# Patient Record
Sex: FEMALE | Race: WHITE | Employment: PART TIME | URBAN - METROPOLITAN AREA
[De-identification: names, ages, dates, MRNs, and addresses within clinical notes are randomized per-mention and may not be internally consistent; named-entity substitution may affect disease eponyms.]

---

## 2017-03-30 ENCOUNTER — HOSPITAL ENCOUNTER (OUTPATIENT)
Dept: SURGERY | Age: 47
Discharge: HOME | End: 2017-03-30
Attending: INTERNAL MEDICINE | Admitting: INTERNAL MEDICINE

## 2017-03-30 VITALS
TEMPERATURE: 97.5 F | HEIGHT: 68 IN | RESPIRATION RATE: 16 BRPM | WEIGHT: 160 LBS | DIASTOLIC BLOOD PRESSURE: 75 MMHG | OXYGEN SATURATION: 100 % | SYSTOLIC BLOOD PRESSURE: 106 MMHG | BODY MASS INDEX: 24.25 KG/M2 | HEART RATE: 72 BPM

## 2017-03-30 LAB — PREGNANCY, URINE: NEGATIVE

## 2017-03-30 RX ORDER — LIDOCAINE HYDROCHLORIDE 10 MG/ML
0.1 INJECTION, SOLUTION EPIDURAL; INFILTRATION; INTRACAUDAL; PERINEURAL ONCE
Status: DISCONTINUED | OUTPATIENT
Start: 2017-03-30 | End: 2017-03-31 | Stop reason: HOSPADM

## 2017-03-30 RX ORDER — SODIUM CHLORIDE, SODIUM LACTATE, POTASSIUM CHLORIDE, CALCIUM CHLORIDE 600; 310; 30; 20 MG/100ML; MG/100ML; MG/100ML; MG/100ML
INJECTION, SOLUTION INTRAVENOUS CONTINUOUS
Status: DISCONTINUED | OUTPATIENT
Start: 2017-03-30 | End: 2017-03-31 | Stop reason: HOSPADM

## 2017-03-30 RX ADMIN — SODIUM CHLORIDE, SODIUM LACTATE, POTASSIUM CHLORIDE, CALCIUM CHLORIDE: 600; 310; 30; 20 INJECTION, SOLUTION INTRAVENOUS at 07:15

## 2017-03-30 ASSESSMENT — PAIN SCALES - GENERAL: PAINLEVEL_OUTOF10: 0

## 2017-03-30 ASSESSMENT — PAIN - FUNCTIONAL ASSESSMENT: PAIN_FUNCTIONAL_ASSESSMENT: 0-10

## 2017-11-08 ENCOUNTER — OFFICE VISIT (OUTPATIENT)
Dept: FAMILY MEDICINE CLINIC | Age: 47
End: 2017-11-08

## 2017-11-08 VITALS
OXYGEN SATURATION: 98 % | HEIGHT: 68 IN | DIASTOLIC BLOOD PRESSURE: 62 MMHG | BODY MASS INDEX: 23.64 KG/M2 | WEIGHT: 156 LBS | SYSTOLIC BLOOD PRESSURE: 96 MMHG | RESPIRATION RATE: 16 BRPM

## 2017-11-08 DIAGNOSIS — R42 DIZZINESS: Primary | ICD-10-CM

## 2017-11-08 LAB
A/G RATIO: 1.8 (ref 1.1–2.2)
ALBUMIN SERPL-MCNC: 4.6 G/DL (ref 3.4–5)
ALP BLD-CCNC: 50 U/L (ref 40–129)
ALT SERPL-CCNC: 14 U/L (ref 10–40)
ANION GAP SERPL CALCULATED.3IONS-SCNC: 14 MMOL/L (ref 3–16)
AST SERPL-CCNC: 18 U/L (ref 15–37)
BILIRUB SERPL-MCNC: 0.7 MG/DL (ref 0–1)
BUN BLDV-MCNC: 12 MG/DL (ref 7–20)
CALCIUM SERPL-MCNC: 9.8 MG/DL (ref 8.3–10.6)
CHLORIDE BLD-SCNC: 100 MMOL/L (ref 99–110)
CO2: 26 MMOL/L (ref 21–32)
CREAT SERPL-MCNC: 0.6 MG/DL (ref 0.6–1.1)
GFR AFRICAN AMERICAN: >60
GFR NON-AFRICAN AMERICAN: >60
GLOBULIN: 2.5 G/DL
GLUCOSE BLD-MCNC: 91 MG/DL (ref 70–99)
HCT VFR BLD CALC: 42.1 % (ref 36–48)
HEMOGLOBIN: 13.9 G/DL (ref 12–16)
MCH RBC QN AUTO: 31.6 PG (ref 26–34)
MCHC RBC AUTO-ENTMCNC: 33.1 G/DL (ref 31–36)
MCV RBC AUTO: 95.5 FL (ref 80–100)
PDW BLD-RTO: 12.7 % (ref 12.4–15.4)
PLATELET # BLD: 266 K/UL (ref 135–450)
PMV BLD AUTO: 8.7 FL (ref 5–10.5)
POTASSIUM SERPL-SCNC: 4.3 MMOL/L (ref 3.5–5.1)
RBC # BLD: 4.41 M/UL (ref 4–5.2)
SODIUM BLD-SCNC: 140 MMOL/L (ref 136–145)
T4 FREE: 1.3 NG/DL (ref 0.9–1.8)
TOTAL PROTEIN: 7.1 G/DL (ref 6.4–8.2)
TSH SERPL DL<=0.05 MIU/L-ACNC: 2.92 UIU/ML (ref 0.27–4.2)
WBC # BLD: 6.6 K/UL (ref 4–11)

## 2017-11-08 PROCEDURE — 36415 COLL VENOUS BLD VENIPUNCTURE: CPT | Performed by: FAMILY MEDICINE

## 2017-11-08 PROCEDURE — 99213 OFFICE O/P EST LOW 20 MIN: CPT | Performed by: FAMILY MEDICINE

## 2017-11-08 ASSESSMENT — PATIENT HEALTH QUESTIONNAIRE - PHQ9
SUM OF ALL RESPONSES TO PHQ QUESTIONS 1-9: 0
SUM OF ALL RESPONSES TO PHQ9 QUESTIONS 1 & 2: 0
2. FEELING DOWN, DEPRESSED OR HOPELESS: 0
1. LITTLE INTEREST OR PLEASURE IN DOING THINGS: 0

## 2017-11-08 NOTE — PROGRESS NOTES
Subjective:      Patient ID: Melodie Navarro is a 40 y.o. female. HPI Patient c/o  Lightheadedness/ dizziness  Which has been going on for several months. Denies any syncope. Usually happens when she  Change position. No assoc CO, SOB or GLASS. She  Gets about 6-7 hrs  Of sleep. Her  2 girls have hx of  Low BP and have   Same  Symptoms. Denies Fhx of HD or DM. Review of Systems  Comprehensive  ROS reviewed are negative except the positives in [place]    Objective:   Physical Exam   Constitutional: She is oriented to person, place, and time. She appears well-developed and well-nourished. No distress. HENT:   Head: Normocephalic and atraumatic. Right Ear: External ear normal.   Left Ear: External ear normal.   Nose: Nose normal.   Eyes: Right eye exhibits no discharge. Neck: Normal range of motion. Neck supple. No thyromegaly present. Cardiovascular: Normal rate, regular rhythm, normal heart sounds and intact distal pulses. Pulmonary/Chest: Effort normal and breath sounds normal. No respiratory distress. She has no wheezes. She has no rales. Abdominal: Soft. Bowel sounds are normal. She exhibits no distension. There is no tenderness. There is no rebound. Musculoskeletal: Normal range of motion. She exhibits no edema, tenderness or deformity. Lymphadenopathy:     She has no cervical adenopathy. Neurological: She is alert and oriented to person, place, and time. Skin: She is not diaphoretic. Nursing note and vitals reviewed. Assessment:      1. Dizziness             Plan:      Orders Placed This Encounter   Procedures    CBC    Comprehensive Metabolic Panel    TSH without Reflex    T4, Free     Increase fluid intake  May add a little salt in the diet to help raise BP.

## 2018-03-23 ENCOUNTER — HOSPITAL ENCOUNTER (OUTPATIENT)
Dept: MAMMOGRAPHY | Age: 48
Discharge: HOME | End: 2018-03-23
Attending: OBSTETRICS & GYNECOLOGY | Admitting: OBSTETRICS & GYNECOLOGY

## 2018-03-23 DIAGNOSIS — Z12.31 ENCOUNTER FOR SCREENING MAMMOGRAM FOR BREAST CANCER: ICD-10-CM

## 2019-12-18 ENCOUNTER — HOSPITAL ENCOUNTER (OUTPATIENT)
Dept: WOMENS IMAGING | Age: 49
Discharge: HOME | End: 2019-12-18
Payer: COMMERCIAL

## 2019-12-18 DIAGNOSIS — N64.4 PAIN IN BREAST: ICD-10-CM

## 2019-12-18 PROCEDURE — G0279 TOMOSYNTHESIS, MAMMO: HCPCS

## 2019-12-18 PROCEDURE — 76642 ULTRASOUND BREAST LIMITED: CPT

## 2020-01-30 ENCOUNTER — HOSPITAL ENCOUNTER (OUTPATIENT)
Dept: MRI IMAGING | Age: 50
Discharge: HOME | End: 2020-01-30
Payer: COMMERCIAL

## 2020-02-05 ENCOUNTER — HOSPITAL ENCOUNTER (EMERGENCY)
Age: 50
Discharge: HOME | End: 2020-02-05
Attending: EMERGENCY MEDICINE
Payer: COMMERCIAL

## 2020-02-05 ENCOUNTER — APPOINTMENT (OUTPATIENT)
Dept: CT IMAGING | Age: 50
End: 2020-02-05
Payer: COMMERCIAL

## 2020-02-05 VITALS
HEIGHT: 68 IN | TEMPERATURE: 97.6 F | OXYGEN SATURATION: 99 % | RESPIRATION RATE: 15 BRPM | BODY MASS INDEX: 23.64 KG/M2 | WEIGHT: 156 LBS | SYSTOLIC BLOOD PRESSURE: 115 MMHG | HEART RATE: 72 BPM | DIASTOLIC BLOOD PRESSURE: 70 MMHG

## 2020-02-05 LAB
ANION GAP SERPL CALCULATED.3IONS-SCNC: 11 MMOL/L (ref 3–16)
BASOPHILS ABSOLUTE: 0 K/UL (ref 0–0.2)
BASOPHILS RELATIVE PERCENT: 0.5 %
BUN BLDV-MCNC: 13 MG/DL (ref 7–20)
CALCIUM SERPL-MCNC: 9.5 MG/DL (ref 8.3–10.6)
CHLORIDE BLD-SCNC: 101 MMOL/L (ref 99–110)
CO2: 26 MMOL/L (ref 21–32)
CREAT SERPL-MCNC: 0.7 MG/DL (ref 0.6–1.1)
EOSINOPHILS ABSOLUTE: 0.1 K/UL (ref 0–0.6)
EOSINOPHILS RELATIVE PERCENT: 2 %
GFR AFRICAN AMERICAN: >60
GFR NON-AFRICAN AMERICAN: >60
GLUCOSE BLD-MCNC: 103 MG/DL (ref 70–99)
GLUCOSE BLD-MCNC: 107 MG/DL (ref 70–99)
HCG QUALITATIVE: NEGATIVE
HCT VFR BLD CALC: 41.5 % (ref 36–48)
HEMOGLOBIN: 14 G/DL (ref 12–16)
LYMPHOCYTES ABSOLUTE: 1 K/UL (ref 1–5.1)
LYMPHOCYTES RELATIVE PERCENT: 16.6 %
MCH RBC QN AUTO: 31.8 PG (ref 26–34)
MCHC RBC AUTO-ENTMCNC: 33.8 G/DL (ref 31–36)
MCV RBC AUTO: 93.9 FL (ref 80–100)
MONOCYTES ABSOLUTE: 0.4 K/UL (ref 0–1.3)
MONOCYTES RELATIVE PERCENT: 7.5 %
NEUTROPHILS ABSOLUTE: 4.4 K/UL (ref 1.7–7.7)
NEUTROPHILS RELATIVE PERCENT: 73.4 %
PDW BLD-RTO: 13.1 % (ref 12.4–15.4)
PERFORMED ON: ABNORMAL
PLATELET # BLD: 226 K/UL (ref 135–450)
PMV BLD AUTO: 7.9 FL (ref 5–10.5)
POTASSIUM SERPL-SCNC: 4.5 MMOL/L (ref 3.5–5.1)
RBC # BLD: 4.42 M/UL (ref 4–5.2)
SODIUM BLD-SCNC: 138 MMOL/L (ref 136–145)
WBC # BLD: 6 K/UL (ref 4–11)

## 2020-02-05 PROCEDURE — 84703 CHORIONIC GONADOTROPIN ASSAY: CPT

## 2020-02-05 PROCEDURE — 70450 CT HEAD/BRAIN W/O DYE: CPT

## 2020-02-05 PROCEDURE — 80048 BASIC METABOLIC PNL TOTAL CA: CPT

## 2020-02-05 PROCEDURE — 85025 COMPLETE CBC W/AUTO DIFF WBC: CPT

## 2020-02-05 PROCEDURE — 99284 EMERGENCY DEPT VISIT MOD MDM: CPT

## 2020-02-05 PROCEDURE — 6370000000 HC RX 637 (ALT 250 FOR IP): Performed by: EMERGENCY MEDICINE

## 2020-02-05 RX ORDER — PREDNISONE 20 MG/1
60 TABLET ORAL ONCE
Status: COMPLETED | OUTPATIENT
Start: 2020-02-05 | End: 2020-02-05

## 2020-02-05 RX ORDER — MINERAL OIL, PETROLATUM 425; 568 MG/G; MG/G
0.5 OINTMENT OPHTHALMIC NIGHTLY PRN
Qty: 1 TUBE | Refills: 0 | Status: SHIPPED | OUTPATIENT
Start: 2020-02-05

## 2020-02-05 RX ORDER — FAMCICLOVIR 500 MG/1
500 TABLET, FILM COATED ORAL 3 TIMES DAILY
Qty: 21 TABLET | Refills: 0 | Status: SHIPPED | OUTPATIENT
Start: 2020-02-05 | End: 2020-02-12

## 2020-02-05 RX ORDER — PREDNISONE 20 MG/1
TABLET ORAL
Qty: 21 TABLET | Refills: 0 | Status: SHIPPED | OUTPATIENT
Start: 2020-02-05

## 2020-02-05 RX ADMIN — PREDNISONE 60 MG: 20 TABLET ORAL at 13:45

## 2020-02-05 ASSESSMENT — PAIN DESCRIPTION - LOCATION: LOCATION: HEAD

## 2020-02-05 ASSESSMENT — PAIN SCALES - GENERAL: PAINLEVEL_OUTOF10: 4

## 2020-02-07 NOTE — ED PROVIDER NOTES
file    Number of children: Not on file    Years of education: Not on file    Highest education level: Not on file   Occupational History    Not on file   Social Needs    Financial resource strain: Not on file    Food insecurity:     Worry: Not on file     Inability: Not on file    Transportation needs:     Medical: Not on file     Non-medical: Not on file   Tobacco Use    Smoking status: Passive Smoke Exposure - Never Smoker    Smokeless tobacco: Never Used   Substance and Sexual Activity    Alcohol use: Yes     Alcohol/week: 0.0 standard drinks     Comment: 24/wk    Drug use: No    Sexual activity: Yes     Partners: Male   Lifestyle    Physical activity:     Days per week: Not on file     Minutes per session: Not on file    Stress: Not on file   Relationships    Social connections:     Talks on phone: Not on file     Gets together: Not on file     Attends Mosque service: Not on file     Active member of club or organization: Not on file     Attends meetings of clubs or organizations: Not on file     Relationship status: Not on file    Intimate partner violence:     Fear of current or ex partner: Not on file     Emotionally abused: Not on file     Physically abused: Not on file     Forced sexual activity: Not on file   Other Topics Concern    Not on file   Social History Narrative    Not on file       Nursing Notes Reviewed      ROS:  GENERAL:  No fever, no chills, no diaphoresis, no appetite changes  EYES: no eye discharge, no eye redness, no visual changes  ENT: no nasal congestion, no sore throat  CARDIAC: no chest pain, no palpitations, no leg swelling  PULM: no cough, no shortness of breath  ABD: no abdominal pain, no nausea, no vomiting, no diarrhea  : no dysuria, no hematuria, no urgency, no frequency.  No flank pain  MUSCULOSKELETAL: no back pain, no arthralgias, no myalgias  NEURO: + headache, no lightheadedness, no dizziness, + numbness, + weakness, no syncope, no confusion, no radiologic studies (i.e. CT, MRI, ultrasounds, etc ) have been interpreted by radiologist.     [address] [place] [address]   Final Result   No acute intracranial abnormality. Labs:  Results for orders placed or performed during the hospital encounter of 02/05/20   CBC Auto Differential   Result Value Ref Range    WBC 6.0 4.0 - 11.0 K/uL    RBC 4.42 4.00 - 5.20 M/uL    Hemoglobin 14.0 12.0 - 16.0 g/dL    Hematocrit 41.5 36.0 - 48.0 %    MCV 93.9 80.0 - 100.0 fL    MCH 31.8 26.0 - 34.0 pg    MCHC 33.8 31.0 - 36.0 g/dL    RDW 13.1 12.4 - 15.4 %    Platelets 425 179 - 482 K/uL    MPV 7.9 5.0 - 10.5 fL    Neutrophils % 73.4 %    Lymphocytes % 16.6 %    Monocytes % 7.5 %    Eosinophils % 2.0 %    Basophils % 0.5 %    Neutrophils Absolute 4.4 1.7 - 7.7 K/uL    Lymphocytes Absolute 1.0 1.0 - 5.1 K/uL    Monocytes Absolute 0.4 0.0 - 1.3 K/uL    Eosinophils Absolute 0.1 0.0 - 0.6 K/uL    Basophils Absolute 0.0 0.0 - 0.2 K/uL   Basic Metabolic Panel   Result Value Ref Range    Sodium 138 136 - 145 mmol/L    Potassium 4.5 3.5 - 5.1 mmol/L    Chloride 101 99 - 110 mmol/L    CO2 26 21 - 32 mmol/L    Anion Gap 11 3 - 16    Glucose 103 (H) 70 - 99 mg/dL    BUN 13 7 - 20 mg/dL    CREATININE 0.7 0.6 - 1.1 mg/dL    GFR Non-African American >60 >60    GFR African American >60 >60    Calcium 9.5 8.3 - 10.6 mg/dL   HCG Qualitative, Serum   Result Value Ref Range    hCG Qual Negative Detects HCG level >10 MIU/mL   POCT Glucose   Result Value Ref Range    POC Glucose 107 (H) 70 - 99 mg/dl    Performed on ACCU-CHEK        Treatment in the department:  Patient received the following while in the ED. Medications   predniSONE (DELTASONE) tablet 60 mg (60 mg Oral Given 2/5/20 1345)       Medical decision making:  Patient with isolated facial paresthesia and slight facial droop. No risk factors for CVA. No extremity symptoms. Left postauricular headache. CT negative. I suspect this represents bells palsy rather than stroke.  Will place on

## 2020-07-17 ENCOUNTER — HOSPITAL ENCOUNTER (OUTPATIENT)
Dept: WOMENS IMAGING | Age: 50
Discharge: HOME | End: 2020-07-17
Payer: COMMERCIAL

## 2020-07-17 PROCEDURE — G0279 TOMOSYNTHESIS, MAMMO: HCPCS

## 2020-09-13 ENCOUNTER — HOSPITAL ENCOUNTER (EMERGENCY)
Age: 50
Discharge: HOME | End: 2020-09-13
Payer: COMMERCIAL

## 2020-09-13 ENCOUNTER — APPOINTMENT (OUTPATIENT)
Dept: GENERAL RADIOLOGY | Age: 50
End: 2020-09-13
Payer: COMMERCIAL

## 2020-09-13 VITALS
HEIGHT: 68 IN | DIASTOLIC BLOOD PRESSURE: 57 MMHG | WEIGHT: 166 LBS | HEART RATE: 74 BPM | SYSTOLIC BLOOD PRESSURE: 99 MMHG | BODY MASS INDEX: 25.16 KG/M2 | OXYGEN SATURATION: 99 % | RESPIRATION RATE: 16 BRPM | TEMPERATURE: 98.7 F

## 2020-09-13 PROCEDURE — 73610 X-RAY EXAM OF ANKLE: CPT

## 2020-09-13 PROCEDURE — 99283 EMERGENCY DEPT VISIT LOW MDM: CPT

## 2020-09-13 ASSESSMENT — PAIN DESCRIPTION - LOCATION: LOCATION: ANKLE

## 2020-09-13 ASSESSMENT — PAIN SCALES - GENERAL: PAINLEVEL_OUTOF10: 4

## 2020-09-13 ASSESSMENT — PAIN DESCRIPTION - PAIN TYPE: TYPE: ACUTE PAIN

## 2020-09-13 NOTE — ED PROVIDER NOTES
[facility]  ED  eMERGENCY dEPARTMENT eNCOUnter        Pt Name: Little Marinelli  MRN: [id number]  [place] 1970  Date of evaluation: 9/13/2020  Provider: Gino Sampson PA-C  PCP: Gracy Cifuentes MD  ED Attending: Carl Carter MD    This patient was not seen by the ED attending  History is provided by the patient    CHIEF COMPLAINT:  Ankle Pain (patient rolled right ankle last night)      HISTORY OF PRESENT ILLNESS:  Little Marinelli is a 51 y.o. female who presents to the ED via private vehicle with complaints of right ankle injury. The patient reports \"rolling her ankle\" last night. She states she was walking in her driveway with flip-flops. It was raining and wet and she describes an inversion injury. She immediately had pain and has developed swelling over the lateral aspect of the right ankle joint. At rest she reports ankle pain is 4/10. With movement or attempts at weightbearing/ambulation pain is 9/10. She took Tylenol this morning for pain without much relief. She denies other injuries from this event. No other complaints, modifying factors or associated symptoms. Nursing notes reviewed. Past Medical History:   Diagnosis Date    Arthritis     Back pain      Past Surgical History:   Procedure Laterality Date    BREAST SURGERY      COLONOSCOPY  03/30/2017     History reviewed. No pertinent family history.   Social History     Socioeconomic History    Marital status:      Spouse name: Not on file    Number of children: Not on file    Years of education: Not on file    Highest education level: Not on file   Occupational History    Not on file   Social Needs    Financial resource strain: Not on file    Food insecurity     Worry: Not on file     Inability: Not on file    Transportation needs     Medical: Not on file     Non-medical: Not on file   Tobacco Use    Smoking status: Passive Smoke Exposure - Never Smoker    Smokeless tobacco: Never Awake and alert. Well-developed. Well-nourished. Non-toxic. Cooperative. No acute distress. HENT: Normocephalic. Atraumatic. External ears normal, without discharge. Nose normal. Mucous membranes moist.  EYES: Conjunctiva non-injected. No scleral icterus. PERRL. EOM's grossly intact. NECK: Supple. Normal ROM. CARDIOVASCULAR: Normal heart rate. Intact distal pulses. PULMONARY/CHEST WALL: Breathing is unlabored. Equal, symmetric chest rise. Speaking comfortably in full sentences. ABDOMEN: Nondistended  MUSKULOSKELETAL: Right lower extremity: No acute deformities. Swelling over the right lateral malleolus with tenderness to palpate. No crepitus. There is mild tenderness over the Achilles though no defect and negative Jimenez test.  Limited range of motion of ankle due to pain. Other joints with good ROM. SKIN: Warm and dry. Skin intact. No bruising. NEUROLOGICAL: Alert and oriented x 3. Strength is 5/5 in all extremities and sensation is intact. PSYCHIATRIC: Normal affect    Labs:    None    RADIOLOGY:    All x-ray studies are viewed/reviewed by me. Formal interpretations per the radiologist are as follows:      Xr Ankle Right (min 3 Views)    Result Date: 9/13/2020  Mild ankle soft tissue injury. ED COURSE/MDM:  Patient was given the following medications: None      I have evaluated this patient here in the ED. patient arrives with a inversion injury to the right ankle that occurred last night. There is pain and swelling over the lateral malleolus. X-rays of the ankle joint are negative for fracture. Patient is placed in an Ace wrap and Aircast.  She has crutches at home to assist with ambulation. She declines anything for pain in the ED because she has not eaten yet. She has tramadol at home which she takes on an as-needed basis for back pain. I recommended this of her ankle pain was severe. Otherwise I recommended over-the-counter analgesics like Tylenol or ibuprofen.   She will be given an orthopedic referral for persistent or worsening pain. I estimate there is LOW risk for ACUTE FRACTURE, COMPARTMENT SYNDROME, DEEP VENOUS THROMBOSIS, SEPTIC ARTHRITIS, TENDON OR NEUROVASCULAR INJURY, thus I consider the discharge disposition reasonable. Arnulfo Torres and I have discussed the diagnosis and risks, and we agree with discharging home to follow-up with their primary doctor or the referral orthopedist. We also discussed returning to the Emergency Department immediately if new or worsening symptoms occur. We have discussed the symptoms which are most concerning (e.g., changing or worsening pain, numbness, weakness) that necessitate immediate return. CLINICAL IMPRESSION:  1. Sprain of right ankle, unspecified ligament, initial encounter        Blood pressure (!) 99/57, pulse 74, temperature 98.7 °F (37.1 °C), temperature source Oral, resp. rate 16, height 5' 8\" (1.727 m), weight 166 lb (75.3 kg), SpO2 99 %, not currently breastfeeding. PATIENT REFERRED TO:  DO Corey Neal [id number] [facility] [address]  [phone]    Schedule an appointment as soon as possible for a visit           DISPOSITION  Patient was discharged to home in good condition.           Aldo Tineo, [address]  09/13/20 1011

## 2020-09-13 NOTE — ED NOTES
Wrapped pt's right ankle with 4 inch ace wrap, then fitted with Air Cast. Pt stated discomfort with swelling, however tolerated application and acknowledged all care taking instructions. Pt has personal pair of crutches in car.      Tresa Ruiz  09/13/20 2270

## 2021-10-27 ENCOUNTER — HOSPITAL ENCOUNTER (OUTPATIENT)
Dept: WOMENS IMAGING | Age: 51
Discharge: HOME | End: 2021-10-27
Payer: COMMERCIAL

## 2021-10-27 VITALS — WEIGHT: 165 LBS | HEIGHT: 68 IN | BODY MASS INDEX: 25.01 KG/M2

## 2021-10-27 DIAGNOSIS — Z12.31 VISIT FOR SCREENING MAMMOGRAM: ICD-10-CM

## 2021-10-27 PROCEDURE — 77067 SCR MAMMO BI INCL CAD: CPT

## 2022-11-04 ENCOUNTER — HOSPITAL ENCOUNTER (OUTPATIENT)
Dept: WOMENS IMAGING | Age: 52
Discharge: HOME | End: 2022-11-04
Payer: COMMERCIAL

## 2022-11-04 DIAGNOSIS — Z12.31 VISIT FOR SCREENING MAMMOGRAM: ICD-10-CM

## 2022-11-04 PROCEDURE — 77063 BREAST TOMOSYNTHESIS BI: CPT
